# Patient Record
Sex: MALE | Race: WHITE | NOT HISPANIC OR LATINO | Employment: OTHER | URBAN - NONMETROPOLITAN AREA
[De-identification: names, ages, dates, MRNs, and addresses within clinical notes are randomized per-mention and may not be internally consistent; named-entity substitution may affect disease eponyms.]

---

## 2019-11-25 ENCOUNTER — OFFICE VISIT (OUTPATIENT)
Dept: PODIATRY | Facility: CLINIC | Age: 59
End: 2019-11-25

## 2019-11-25 VITALS — OXYGEN SATURATION: 94 % | BODY MASS INDEX: 24.82 KG/M2 | HEART RATE: 74 BPM | WEIGHT: 167.6 LBS | HEIGHT: 69 IN

## 2019-11-25 DIAGNOSIS — M79.671 FOOT PAIN, BILATERAL: ICD-10-CM

## 2019-11-25 DIAGNOSIS — M20.5X2 ACQUIRED OVERRIDING TOE OF LEFT FOOT: Primary | ICD-10-CM

## 2019-11-25 DIAGNOSIS — M20.5X1 ACQUIRED OVERRIDING TOE OF RIGHT FOOT: ICD-10-CM

## 2019-11-25 DIAGNOSIS — M79.672 FOOT PAIN, BILATERAL: ICD-10-CM

## 2019-11-25 PROCEDURE — 99203 OFFICE O/P NEW LOW 30 MIN: CPT | Performed by: PODIATRIST

## 2019-11-25 NOTE — PROGRESS NOTES
"Marcus Schilling  1960  59 y.o. male     Patient presents today with bilateral toe problems on both of his 2nd toes.     11/25/2019    Chief Complaint   Patient presents with   • Left Foot - Toe Problem     2nd toe   • Right Foot - Toe Problem     2nd toe       History of Present Illness    Marcus Schilling is a 59 y.o.male who presents to clinic today with chief complaint of toe deformities on both feet.  Patient states that for the last 10 years his second toes have been writing on top of the great toes.  He denies pain today.  However, he does state there is pain in certain shoe gear.  He denies any injuries or trauma to his feet.  He has no other pedal complaints.      Past Medical History:   Diagnosis Date   • Hammer toe          History reviewed. No pertinent surgical history.      Family History   Problem Relation Age of Onset   • Cancer Mother    • Heart disease Father    • Cancer Sister        No Known Allergies    Social History     Socioeconomic History   • Marital status:      Spouse name: Not on file   • Number of children: Not on file   • Years of education: Not on file   • Highest education level: Not on file   Tobacco Use   • Smoking status: Light Tobacco Smoker     Types: Cigarettes   • Smokeless tobacco: Never Used   Substance and Sexual Activity   • Alcohol use: Yes     Alcohol/week: 1.2 oz     Types: 2 Cans of beer per week   • Drug use: No   • Sexual activity: Defer         No current outpatient medications on file.     No current facility-administered medications for this visit.        Review of Systems   Constitutional: Negative.    HENT: Negative.    Eyes: Negative.    Respiratory: Negative.    Cardiovascular: Negative.    Gastrointestinal: Negative.    Endocrine: Negative.    Genitourinary: Negative.    Musculoskeletal:        Foot pain     Skin: Negative.    Neurological: Negative.    Psychiatric/Behavioral: Negative.          OBJECTIVE    Pulse 74   Ht 175.3 cm (69\")   Wt 76 kg " (167 lb 9.6 oz)   SpO2 94%   BMI 24.75 kg/m²       Physical Exam   Constitutional: He is oriented to person, place, and time. He appears well-developed and well-nourished. No distress.   HENT:   Head: Normocephalic and atraumatic.   Nose: Nose normal.   Eyes: Conjunctivae and EOM are normal. Pupils are equal, round, and reactive to light.   Pulmonary/Chest: Effort normal. No respiratory distress. He has no wheezes.   Musculoskeletal: Normal range of motion. He exhibits no edema or deformity.   Neurological: He is alert and oriented to person, place, and time.   Skin: Skin is warm and dry. Capillary refill takes less than 2 seconds.   Psychiatric: He has a normal mood and affect. His behavior is normal. Thought content normal.   Vitals reviewed.      Gait: normal     Assistive Device: none     Lower Extremity    Cardiovascular:    DP/PT pulses palpable bilateral  CFT brisk  to all digits  Skin temp is warm to warm from proximal tibia to distal digits bilateral  Pedal hair growth present.   No erythema or edema noted   Musculoskeletal:  Muscle strength is 5/5 for all muscle groups tested   ROM of the 1st MTP is WNL bilateral   ROM of the MTJ is WNL bilateral   ROM of the STJ is WNL bilateral   ROM of the ankle joint is  WNL bilateral   Overriding second digit bilateral  Slight pain on palpation to the second metatarsal heads plantarly bilateral  Dermatological:   Skin is warm, dry and intact bilateral  Webspaces 1-4 bilateral are clean, dry and intact.   No subcutaneous nodules or masses noted    Nails 1-5 bilateral are within normal limits for length   Neurological:   Protective sensation intact   Sensation intact to light touch        Procedures        ASSESSMENT AND PLAN    Marcus was seen today for toe problem and toe problem.    Diagnoses and all orders for this visit:    Acquired overriding toe of left foot  -     XR foot 3+ vw bilateral; Future    Acquired overriding toe of right foot    Foot pain,  bilateral      - Comprehensive foot and ankle exam performed.   -Radiographs taken and reviewed.  -Diagnosis, etiology and treatment of overriding second digit/plantar plate injury discussed in detail with the patient.  Conservative and surgical treatment options were discussed.  Patient is interested in pursuing surgical intervention at the beginning of 2020.  - All questions were answered to the patients satisfaction.  - RTC when ready to schedule surgery.            This document has been electronically signed by William Quintana DPM on November 25, 2019 12:19 PM     11/25/2019  12:19 PM

## 2022-09-22 ENCOUNTER — TRANSCRIBE ORDERS (OUTPATIENT)
Dept: ORTHOPEDIC SURGERY | Facility: CLINIC | Age: 62
End: 2022-09-22

## 2022-09-22 DIAGNOSIS — M25.551 RIGHT HIP PAIN: Primary | ICD-10-CM

## 2022-10-28 DIAGNOSIS — M25.551 RIGHT HIP PAIN: Primary | ICD-10-CM

## 2022-11-01 ENCOUNTER — OFFICE VISIT (OUTPATIENT)
Dept: ORTHOPEDIC SURGERY | Facility: CLINIC | Age: 62
End: 2022-11-01

## 2022-11-01 VITALS — HEIGHT: 69 IN | WEIGHT: 160 LBS | BODY MASS INDEX: 23.7 KG/M2

## 2022-11-01 DIAGNOSIS — F17.210 LIGHT CIGARETTE SMOKER (1-9 CIGARETTES PER DAY): ICD-10-CM

## 2022-11-01 DIAGNOSIS — M25.551 RIGHT HIP PAIN: Primary | ICD-10-CM

## 2022-11-01 PROCEDURE — 99204 OFFICE O/P NEW MOD 45 MIN: CPT | Performed by: ORTHOPAEDIC SURGERY

## 2022-11-01 RX ORDER — MELOXICAM 15 MG/1
TABLET ORAL
Qty: 30 TABLET | Refills: 3 | Status: SHIPPED | OUTPATIENT
Start: 2022-11-01

## 2022-11-01 NOTE — PROGRESS NOTES
"Marcus Schilling is a 62 y.o. male   Primary provider:  Provider, No Known       Chief Complaint   Patient presents with   • Right Hip - Initial Evaluation, Pain       HISTORY OF PRESENT ILLNESS:    Patient states that he has been having pain in the posterior aspect of his right hip.  He has occasional sharp pains.  He has occasional pain with walking.  No numbness or tingling.  No shooting pain going down his leg.  The increased pain seems to be random.  He can put on his socks and shoes.  He has previously done physical therapy for sciatica in his left leg and states that he he knows exercises to do for low back exercises.  He does not report groin pain.    Hip Pain   Incident onset: 2-3 years  The pain is present in the right hip. The quality of the pain is described as stabbing. The pain is at a severity of 0/10. The pain is severe. The pain has been intermittent since onset. Exacerbated by: sitting, lying down         CONCURRENT MEDICAL HISTORY:    Past Medical History:   Diagnosis Date   • Hammer toe        No Known Allergies      Current Outpatient Medications:   •  meloxicam (MOBIC) 15 MG tablet, 1 PO Daily with food., Disp: 30 tablet, Rfl: 3    History reviewed. No pertinent surgical history.    Family History   Problem Relation Age of Onset   • Cancer Mother    • Heart disease Father    • Cancer Sister        Social History     Socioeconomic History   • Marital status: Single   Tobacco Use   • Smoking status: Light Smoker     Types: Cigarettes   • Smokeless tobacco: Never   Substance and Sexual Activity   • Alcohol use: Yes     Alcohol/week: 2.0 standard drinks     Types: 2 Cans of beer per week   • Drug use: No   • Sexual activity: Defer        Review of Systems   Constitutional: Negative for chills and fever.   HENT:        Ringing in the ears      Respiratory: Positive for cough.    All other systems reviewed and are negative.      PHYSICAL EXAMINATION:       Ht 175.3 cm (69\")   Wt 72.6 kg (160 lb)   " BMI 23.63 kg/m²     Physical Exam  Constitutional:       General: He is not in acute distress.     Appearance: Normal appearance.   Pulmonary:      Effort: Pulmonary effort is normal. No respiratory distress.   Neurological:      Mental Status: He is alert and oriented to person, place, and time.         GAIT:     [x]  Normal  []  Antalgic    Assistive device: [x]  None  []  Walker     []  Crutches  []  Cane     []  Wheelchair  []  Stretcher    Right Ankle Exam     Comments:  Nontender around the left hip.  Good distal pulses and sensation.  Negative Stinchfield test.  Negative logroll test.  Negative straight leg raise.  Good internal and external rotation of the hip.                                    ASSESSMENT:    Diagnoses and all orders for this visit:    Right hip pain    Light cigarette smoker (1-9 cigarettes per day)    Other orders  -     meloxicam (MOBIC) 15 MG tablet; 1 PO Daily with food.          PLAN    Patient has been having posterior right hip pain.  He has good range of motion of his back and his right hip without recreation of the pain.  Special testing for hip pathology is negative.  We discussed use of meloxicam for anti-inflammatory medication.  He will begin that.  We discussed possibility of physical therapy to help with general strength and conditioning exercises and stretching exercises for both his lower back and his hip, however, the patient has declined physical therapy and says that it does not work.  We discussed general strength and conditioning exercises on his own.  Activity as tolerated.  Recheck if symptoms worsen or fail to improve and we discussed the possibility of MRI of his right hip or his lumbar spine depending on change in symptoms.  X-rays were reviewed which do not show significant signs of arthritic change in his hips.      Return if symptoms worsen or fail to improve, for recheck.    Raul Shafer MD

## 2024-07-10 ENCOUNTER — HOSPITAL ENCOUNTER (OUTPATIENT)
Dept: MRI IMAGING | Age: 64
Discharge: HOME | End: 2024-07-10
Payer: OTHER GOVERNMENT

## 2024-07-10 ENCOUNTER — HOSPITAL ENCOUNTER (OUTPATIENT)
Dept: NEUROLOGY | Age: 64
Discharge: HOME | End: 2024-07-10
Payer: OTHER GOVERNMENT

## 2024-07-10 DIAGNOSIS — R20.2 PARESTHESIA OF SKIN: ICD-10-CM

## 2024-07-10 DIAGNOSIS — M25.512 LEFT SHOULDER PAIN, UNSPECIFIED CHRONICITY: ICD-10-CM

## 2024-07-10 DIAGNOSIS — M62.512 MUSCLE WASTING AND ATROPHY, NOT ELSEWHERE CLASSIFIED, LEFT SHOULDER: ICD-10-CM

## 2024-07-10 PROCEDURE — 95909 NRV CNDJ TST 5-6 STUDIES: CPT | Performed by: PSYCHIATRY & NEUROLOGY

## 2024-07-10 PROCEDURE — 95909 NRV CNDJ TST 5-6 STUDIES: CPT

## 2024-07-10 PROCEDURE — 95886 MUSC TEST DONE W/N TEST COMP: CPT

## 2024-07-10 PROCEDURE — 73221 MRI JOINT UPR EXTREM W/O DYE: CPT

## 2024-07-10 PROCEDURE — 95886 MUSC TEST DONE W/N TEST COMP: CPT | Performed by: PSYCHIATRY & NEUROLOGY

## 2024-07-11 NOTE — PROCEDURES
[facility]  Neurophysiology Department  [address] [place]  [address]  Phone [phone]  Fax [phone]     NEUROPHYSIOLOGY REPORT  Patient Data  Patient Name Dharmesh Rivera Referring Provider Andrea Contreras PA-C   Account Number [id number] Interpreting physician Samuel Cameron M.D.    1960 [employer]ologist [employer] Contreras   Age 64 Test Nerve conduction studies/electromyogram   Indications for the test weakness Date of test 7/10/2024       HISTORY:     Dharmesh Rivera is a 64 year old man who complains of weakness in the left shoulder.      SUMMARY:     Nerve conduction studies of the left upper extremity showed a prolonged martinez sensory distal latency and a relatively prolonged median motor distal latency.       Electromyogram of the left upper extremity showed mildly large, polyphasic motor unit potentials with reduced recruitment in C5 innervated muscles.      INTERPRETATION:     The findings are those of a mild, chronic left C5 radiculopathy and a mild median neuropathy at the left wrist (carpal tunnel syndrome).                            Samuel Cameron M.D.      Sensory NCS      Nerve / Sites Rec. Site Onset Lat Peak Lat NP Amp PP Amp Segments Distance Peak Diff Velocity     ms ms µV µV  cm ms m/s   L Median, Ulnar - Transcarpal comparison      Median Palm Wrist 1.7 2.6 24.6 28.8 Median Palm - Wrist 8  46      Ref.   ?2.2 ?50.0  Ref.         Ulnar Palm Wrist 1.7 2.2 11.7 17.0 Ulnar Palm - Wrist 8  47      Ref.   ?2.2 ?15.0  Ref.            Median Palm - Ulnar Palm  0.2          Ref.  ?0.3    L Radial - Anatomical snuff box (Forearm)      Forearm Wrist 2.1 2.8 17.7 18.9 Forearm - Wrist 10  48      Ref.   ?2.9 ?20.0  Ref.              Motor NCS      Nerve / Sites Muscle Latency Ref. Amplitude Ref. Amp % Duration Segments Distance Lat Diff Velocity Ref.     ms ms mV mV % ms  cm ms m/s m/s   L Median - APB      Wrist APB 4.1 ?4.4 10.0 ?4.0 100 6.7 Wrist - APB